# Patient Record
Sex: FEMALE | Race: WHITE | NOT HISPANIC OR LATINO | Employment: UNEMPLOYED | URBAN - METROPOLITAN AREA
[De-identification: names, ages, dates, MRNs, and addresses within clinical notes are randomized per-mention and may not be internally consistent; named-entity substitution may affect disease eponyms.]

---

## 2018-05-25 ENCOUNTER — HOSPITAL ENCOUNTER (EMERGENCY)
Facility: HOSPITAL | Age: 68
Discharge: HOME | End: 2018-05-25
Attending: EMERGENCY MEDICINE
Payer: COMMERCIAL

## 2018-05-25 ENCOUNTER — APPOINTMENT (EMERGENCY)
Dept: RADIOLOGY | Facility: HOSPITAL | Age: 68
End: 2018-05-25
Attending: EMERGENCY MEDICINE
Payer: COMMERCIAL

## 2018-05-25 VITALS
BODY MASS INDEX: 25.69 KG/M2 | TEMPERATURE: 98.4 F | DIASTOLIC BLOOD PRESSURE: 78 MMHG | RESPIRATION RATE: 16 BRPM | OXYGEN SATURATION: 96 % | SYSTOLIC BLOOD PRESSURE: 131 MMHG | HEART RATE: 76 BPM | HEIGHT: 63 IN | WEIGHT: 145 LBS

## 2018-05-25 DIAGNOSIS — R19.7 VOMITING AND DIARRHEA: Primary | ICD-10-CM

## 2018-05-25 DIAGNOSIS — R11.10 VOMITING AND DIARRHEA: Primary | ICD-10-CM

## 2018-05-25 LAB
ALBUMIN SERPL-MCNC: 4.5 G/DL (ref 3.4–5)
ALP SERPL-CCNC: 50 IU/L (ref 35–126)
ALT SERPL-CCNC: 22 IU/L (ref 11–54)
ANION GAP SERPL CALC-SCNC: 7 MEQ/L (ref 3–15)
AST SERPL-CCNC: 24 IU/L (ref 15–41)
BASOPHILS # BLD: 0.03 K/UL (ref 0.01–0.1)
BASOPHILS NFR BLD: 0.4 %
BILIRUB SERPL-MCNC: 0.6 MG/DL (ref 0.3–1.2)
BILIRUB UR QL STRIP.AUTO: NEGATIVE MG/DL
BUN SERPL-MCNC: 14 MG/DL (ref 8–20)
CALCIUM SERPL-MCNC: 9 MG/DL (ref 8.9–10.3)
CHLORIDE SERPL-SCNC: 108 MMOL/L (ref 98–109)
CLARITY UR REFRACT.AUTO: CLEAR
CO2 SERPL-SCNC: 25 MMOL/L (ref 22–32)
COLOR UR AUTO: YELLOW
CREAT SERPL-MCNC: 0.7 MG/DL (ref 0.6–1.1)
DIFFERENTIAL METHOD BLD: ABNORMAL
EOSINOPHIL # BLD: 0.16 K/UL (ref 0.04–0.36)
EOSINOPHIL NFR BLD: 2.2 %
ERYTHROCYTE [DISTWIDTH] IN BLOOD BY AUTOMATED COUNT: 13.1 % (ref 11.7–14.4)
GFR SERPL CREATININE-BSD FRML MDRD: >60 ML/MIN/1.73M*2
GLUCOSE SERPL-MCNC: 91 MG/DL (ref 70–99)
GLUCOSE UR STRIP.AUTO-MCNC: NEGATIVE MG/DL
HCT VFR BLDCO AUTO: 40.4 % (ref 35–45)
HGB BLD-MCNC: 13.4 G/DL (ref 11.8–15.7)
HGB UR QL STRIP.AUTO: NEGATIVE
IMM GRANULOCYTES # BLD AUTO: 0.04 K/UL (ref 0–0.08)
IMM GRANULOCYTES NFR BLD AUTO: 0.5 %
KETONES UR STRIP.AUTO-MCNC: NEGATIVE MG/DL
LEUKOCYTE ESTERASE UR QL STRIP.AUTO: NEGATIVE
LIPASE SERPL-CCNC: 24 U/L (ref 20–51)
LYMPHOCYTES # BLD: 0.93 K/UL (ref 1.2–3.5)
LYMPHOCYTES NFR BLD: 12.6 %
MCH RBC QN AUTO: 30 PG (ref 28–33.2)
MCHC RBC AUTO-ENTMCNC: 33.2 G/DL (ref 32.2–35.5)
MCV RBC AUTO: 90.4 FL (ref 83–98)
MONOCYTES # BLD: 0.45 K/UL (ref 0.28–0.8)
MONOCYTES NFR BLD: 6.1 %
NEUTROPHILS # BLD: 5.79 K/UL (ref 1.7–7)
NEUTS SEG NFR BLD: 78.2 %
NITRITE UR QL STRIP.AUTO: NEGATIVE
NRBC BLD-RTO: 0 %
PDW BLD AUTO: 9.9 FL (ref 9.4–12.3)
PH UR STRIP.AUTO: 6 [PH]
PLATELET # BLD AUTO: 232 K/UL (ref 150–369)
POTASSIUM SERPL-SCNC: 3.5 MMOL/L (ref 3.6–5.1)
PROT SERPL-MCNC: 7.6 G/DL (ref 6–8.2)
PROT UR QL STRIP.AUTO: NEGATIVE
RBC # BLD AUTO: 4.47 M/UL (ref 3.93–5.22)
SODIUM SERPL-SCNC: 140 MMOL/L (ref 136–144)
SP GR UR REFRACT.AUTO: 1.02
UROBILINOGEN UR STRIP-ACNC: 0.2 EU/DL
WBC # BLD AUTO: 7.4 K/UL (ref 3.8–10.5)

## 2018-05-25 PROCEDURE — 99284 EMERGENCY DEPT VISIT MOD MDM: CPT | Mod: 25

## 2018-05-25 PROCEDURE — 80053 COMPREHEN METABOLIC PANEL: CPT | Performed by: EMERGENCY MEDICINE

## 2018-05-25 PROCEDURE — 63600000 HC DRUGS/DETAIL CODE: Performed by: NURSE PRACTITIONER

## 2018-05-25 PROCEDURE — 3E0337Z INTRODUCTION OF ELECTROLYTIC AND WATER BALANCE SUBSTANCE INTO PERIPHERAL VEIN, PERCUTANEOUS APPROACH: ICD-10-PCS | Performed by: EMERGENCY MEDICINE

## 2018-05-25 PROCEDURE — 3E033GC INTRODUCTION OF OTHER THERAPEUTIC SUBSTANCE INTO PERIPHERAL VEIN, PERCUTANEOUS APPROACH: ICD-10-PCS | Performed by: EMERGENCY MEDICINE

## 2018-05-25 PROCEDURE — 85025 COMPLETE CBC W/AUTO DIFF WBC: CPT | Performed by: EMERGENCY MEDICINE

## 2018-05-25 PROCEDURE — 80053 COMPREHEN METABOLIC PANEL: CPT

## 2018-05-25 PROCEDURE — 83690 ASSAY OF LIPASE: CPT | Performed by: EMERGENCY MEDICINE

## 2018-05-25 PROCEDURE — 81003 URINALYSIS AUTO W/O SCOPE: CPT | Performed by: NURSE PRACTITIONER

## 2018-05-25 PROCEDURE — 96375 TX/PRO/DX INJ NEW DRUG ADDON: CPT

## 2018-05-25 PROCEDURE — 74177 CT ABD & PELVIS W/CONTRAST: CPT

## 2018-05-25 PROCEDURE — 83690 ASSAY OF LIPASE: CPT

## 2018-05-25 PROCEDURE — 93005 ELECTROCARDIOGRAM TRACING: CPT | Performed by: EMERGENCY MEDICINE

## 2018-05-25 PROCEDURE — 96374 THER/PROPH/DIAG INJ IV PUSH: CPT

## 2018-05-25 PROCEDURE — 63600105 HC IODINE BASED CONTRAST: Performed by: NURSE PRACTITIONER

## 2018-05-25 PROCEDURE — 3E033NZ INTRODUCTION OF ANALGESICS, HYPNOTICS, SEDATIVES INTO PERIPHERAL VEIN, PERCUTANEOUS APPROACH: ICD-10-PCS | Performed by: EMERGENCY MEDICINE

## 2018-05-25 PROCEDURE — 93005 ELECTROCARDIOGRAM TRACING: CPT

## 2018-05-25 PROCEDURE — 85025 COMPLETE CBC W/AUTO DIFF WBC: CPT

## 2018-05-25 PROCEDURE — 36415 COLL VENOUS BLD VENIPUNCTURE: CPT

## 2018-05-25 PROCEDURE — 25800000 HC PHARMACY IV SOLUTIONS: Performed by: NURSE PRACTITIONER

## 2018-05-25 PROCEDURE — 96361 HYDRATE IV INFUSION ADD-ON: CPT

## 2018-05-25 RX ORDER — MORPHINE SULFATE 4 MG/ML
4 INJECTION, SOLUTION INTRAMUSCULAR; INTRAVENOUS ONCE
Status: COMPLETED | OUTPATIENT
Start: 2018-05-25 | End: 2018-05-25

## 2018-05-25 RX ORDER — OMEPRAZOLE 20 MG/1
20 CAPSULE, DELAYED RELEASE ORAL
COMMUNITY

## 2018-05-25 RX ORDER — CETIRIZINE HYDROCHLORIDE 10 MG/1
10 TABLET ORAL DAILY
COMMUNITY

## 2018-05-25 RX ORDER — SIMVASTATIN 40 MG/1
40 TABLET, FILM COATED ORAL NIGHTLY
COMMUNITY

## 2018-05-25 RX ORDER — ONDANSETRON 4 MG/1
4 TABLET, ORALLY DISINTEGRATING ORAL EVERY 8 HOURS PRN
Qty: 10 TABLET | Refills: 0 | Status: SHIPPED | OUTPATIENT
Start: 2018-05-25

## 2018-05-25 RX ORDER — ONDANSETRON HYDROCHLORIDE 2 MG/ML
4 INJECTION, SOLUTION INTRAVENOUS ONCE
Status: COMPLETED | OUTPATIENT
Start: 2018-05-25 | End: 2018-05-25

## 2018-05-25 RX ORDER — TRAZODONE HYDROCHLORIDE 100 MG/1
450 TABLET ORAL NIGHTLY
COMMUNITY

## 2018-05-25 RX ORDER — SERTRALINE HYDROCHLORIDE 100 MG/1
200 TABLET, FILM COATED ORAL DAILY
COMMUNITY

## 2018-05-25 RX ADMIN — IOHEXOL 120 ML: 300 INJECTION, SOLUTION INTRAVENOUS at 21:51

## 2018-05-25 RX ADMIN — SODIUM CHLORIDE 1000 ML: 9 INJECTION, SOLUTION INTRAVENOUS at 21:10

## 2018-05-25 RX ADMIN — MORPHINE SULFATE 4 MG: 4 INJECTION INTRAVENOUS at 21:10

## 2018-05-25 RX ADMIN — ONDANSETRON 4 MG: 2 INJECTION INTRAMUSCULAR; INTRAVENOUS at 21:10

## 2018-05-25 ASSESSMENT — ENCOUNTER SYMPTOMS
BACK PAIN: 0
VOMITING: 1
DIARRHEA: 1
NUMBNESS: 0
DIAPHORESIS: 0
FEVER: 0
CHILLS: 0
FREQUENCY: 0
DIFFICULTY URINATING: 0
SHORTNESS OF BREATH: 0
ABDOMINAL PAIN: 1
BLOOD IN STOOL: 1
FLANK PAIN: 0
DYSURIA: 0
HEMATURIA: 0
NAUSEA: 1

## 2018-05-26 LAB
ATRIAL RATE: 81
P AXIS: 58
PR INTERVAL: 130
QRS DURATION: 84
QT INTERVAL: 384
QTC CALCULATION(BAZETT): 446
R AXIS: 25
RAINBOW HOLD SPECIMEN: NORMAL
T WAVE AXIS: 86
VENTRICULAR RATE: 81

## 2018-05-26 NOTE — ED PROVIDER NOTES
HPI     Chief Complaint   Patient presents with   • Nausea   • Vomiting   • Diarrhea     68 y/o female with pmh of GERD on Omeprozole presents to the ED c/o n/v/d x last night. Pt reports she had explosive watery diarrhea x last night. Today, pt states her diarrhea has persisted and has noticed BRB in her stool. Pt notes she also developed nausea and vomiting x today. Admits abdominal pain, n/v/d, blood in stool. Denies SOB, fever, chills, urinary sx, chest pain. Pt states she is from out of town and ate crab salad last night prior to onset of sx. Pt reports she receives injections in her left scapula and states she has had continuous pain x 2 week. Pt states last injection was 1.5 months ago.       History provided by:  Patient   used: No    Diarrhea   Quality:  Explosive and watery  Severity:  Moderate  Onset quality:  Sudden  Number of episodes:  Mutliple episodes  Timing:  Constant  Progression:  Worsening  Relieved by:  Nothing  Worsened by:  Nothing  Ineffective treatments:  None tried  Associated symptoms: abdominal pain and vomiting    Associated symptoms: no chills, no diaphoresis, no fever and no URI    Risk factors: suspect food intake (possible - ate crab salad last night)    Risk factors: no recent antibiotic use, no sick contacts and no travel to endemic areas         Patient History     Past Medical History:   Diagnosis Date   • Brain aneurysm    • Depression    • High cholesterol        Past Surgical History:   Procedure Laterality Date   • CEREBRAL ANEURYSM REPAIR     • SHOULDER SURGERY         History reviewed. No pertinent family history.    Social History   Substance Use Topics   • Smoking status: Never Smoker   • Smokeless tobacco: Never Used   • Alcohol use No       Systems Reviewed from Nursing Triage:  Tobacco  Allergies  Meds  Problems  Med Hx  Surg Hx  Soc Hx         Review of Systems     Review of Systems   Constitutional: Negative for chills, diaphoresis and  "fever.   Respiratory: Negative for shortness of breath.    Cardiovascular: Negative for chest pain and leg swelling.   Gastrointestinal: Positive for abdominal pain, blood in stool, diarrhea, nausea and vomiting.   Genitourinary: Negative for difficulty urinating, dysuria, flank pain, frequency and hematuria.   Musculoskeletal: Negative for back pain.   Skin: Negative for rash.   Neurological: Negative for numbness.   All other systems reviewed and are negative.       Physical Exam     ED Triage Vitals   Temp Heart Rate Resp BP SpO2   05/25/18 1927 05/25/18 1927 05/25/18 1927 05/25/18 1927 05/25/18 1927   37.7 °C (99.8 °F) 96 20 137/78 95 %      Temp Source Heart Rate Source Patient Position BP Location FiO2 (%) (Set)   05/25/18 1927 05/25/18 2032 -- -- --   Temporal Monitor          Pulse Ox %: 95 % (05/25/18 2057)  Pulse Ox Interpretation: Normal (05/25/18 2057)  Heart Rate: 84 (05/25/18 2057)  Rhythm Strip Interpretation: Normal Sinus Rhythm (05/25/18 2057)    Patient Vitals for the past 24 hrs:   BP Temp Temp src Pulse Resp SpO2 Height Weight   05/25/18 2115 135/70 - - 76 20 98 % - -   05/25/18 2032 134/63 - - 84 18 95 % - -   05/25/18 1927 137/78 37.7 °C (99.8 °F) Temporal 96 20 95 % 1.6 m (5' 3\") 65.8 kg (145 lb)           Physical Exam   Constitutional: She is oriented to person, place, and time. She appears well-developed and well-nourished. No distress.   HENT:   Head: Normocephalic and atraumatic.   Neck: Normal range of motion.   Cardiovascular: Normal rate, regular rhythm, normal heart sounds and intact distal pulses.    Pulmonary/Chest: Effort normal and breath sounds normal. No respiratory distress. She exhibits no tenderness.   Abdominal: Soft. There is generalized tenderness.   Musculoskeletal: Normal range of motion.   Neurological: She is alert and oriented to person, place, and time.   Skin: Skin is warm and dry. No rash noted.   Psychiatric: She has a normal mood and affect.   Nursing note and " vitals reviewed.           Procedures    ED Course & MDM     Labs Reviewed   COMPREHENSIVE METABOLIC PANEL - Abnormal        Result Value    Potassium 3.5 (*)     Sodium 140      Chloride 108      CO2 25      BUN 14      Creatinine 0.7      Glucose 91      Calcium 9.0      AST (SGOT) 24      ALT (SGPT) 22      Alkaline Phosphatase 50      Total Protein 7.6      Albumin 4.5      Bilirubin, Total 0.6      eGFR >60.0      Anion Gap 7     DIFF COUNT - Abnormal     Lymphocytes, Absolute 0.93 (*)     Differential Type Auto      nRBC 0.0      Immature Granulocytes 0.5      Neutrophils 78.2      Lymphocytes 12.6      Monocytes 6.1      Eosinophils 2.2      Basophils 0.4      Immature Granulocytes, Absolute 0.04      Neutrophils, Absolute 5.79      Monocytes, Absolute 0.45      Eosinophils, Absolute 0.16      Basophils, Absolute 0.03     LIPASE - Normal    Lipase 24     CBC - Normal    WBC 7.40      RBC 4.47      Hemoglobin 13.4      Hematocrit 40.4      MCV 90.4      MCH 30.0      MCHC 33.2      RDW 13.1      Platelets 232      MPV 9.9     UA REFLEX CULTURE (MACROSCOPIC) - Normal    Color, Urine Yellow      Clarity, Urine Clear      Specific Gravity, Urine 1.020      pH, Urine 6.0      Leukocyte Esterase Negative      Nitrite, Urine Negative      Protein, Urine Negative      Glucose, Urine Negative      Ketones, Urine Negative      Urobilinogen, Urine 0.2      Bilirubin, Urine Negative      Blood, Urine Negative     CBC AND DIFFERENTIAL    Narrative:     The following orders were created for panel order CBC and differential.  Procedure                               Abnormality         Status                     ---------                               -----------         ------                     CBC[[id number]]                           Normal              Final result               Diff Count[[id number]]                    Abnormal            Final result                 Please view results for these tests on the individual  orders.   URINALYSIS REFLEX CULTURE    Narrative:     The following orders were created for panel order Urinalysis w/ reflex culture.  Procedure                               Abnormality         Status                     ---------                               -----------         ------                     UA Reflex to Culture (Mac...[[id number]]  Normal              Final result                 Please view results for these tests on the individual orders.   RAINBOW DRAW PANEL    Narrative:     The following orders were created for panel order [place] Draw Panel.  Procedure                               Abnormality         Status                     ---------                               -----------         ------                     RAINBOW RED[[id number]]                                       In process                 RAINBOW LT BLUE[[id number]]                                   In process                 RAINBOW LT GREEN[[id number]]                                  In process                 RAINBOW GOLD[[id number]]                                      In process                   Please view results for these tests on the individual orders.   RAINBOW RED   RAINBOW LT BLUE   RAINBOW LT GREEN   RAINBOW GOLD       ECG 12 lead    (Results Pending)   CT ABDOMEN PELVIS WITH IV CONTRAST    (Results Pending)           MDM  Number of Diagnoses or Management Options  Vomiting and diarrhea:      Amount and/or Complexity of Data Reviewed  Clinical lab tests: reviewed  Tests in the radiology section of CPT®: reviewed  Discuss the patient with other providers: yes    Patient Progress  Patient progress: stable           ED Course as of May 25 2248   Fri May 25, 2018   2136 Impression: N/V/D, generalized abdominal pain, suspect bad food exposure  Plan: Labs, lipase, UA/UC, CT A/P, IV fluids, zofran  [EP]   2239 UA does not indicate infection, no leukocytosis, WBC, lytes, renal function, lipase wnl  D/w lab and radiology results,  instructed to follow clear liquid diet and return precautions CT ABDOMEN PELVIS WITH IV CONTRAST [EP]      ED Course User Index  [EP] Elizabeth P Pallante, CRNP         Clinical Impressions as of May 25 2248   Vomiting and diarrhea     Disposition:     By signing my name below, I, Sivan Vela, attest that this documentation has been prepared under the direction and in the presence of E. Pallante, CRNP.    5/25/2018 9:37 PM      I, Elizabeth P. Pallante, personally performed the services described in this documentation, as documented by the scribe in my presence, and it is both accurate and complete.  5/25/2018 10:51 PM         Sivan Vela  05/25/18 2057       Elizabeth P Pallante, CRNP  05/25/18 7183

## 2018-05-26 NOTE — ED ATTESTATION NOTE
The patient was evaluated and managed by the physician assistant / nurse practitioner. I agree.      Justin Peterson MD  05/25/18 7351

## 2018-05-26 NOTE — DISCHARGE INSTRUCTIONS
Follow a clear liquid diet until symptoms resolve.   Drink plenty of fluids, including fluids with electrolytes (Gatorade, Pedialyte, etc).    Take Zofran as needed for nausea and vomiting.    If you develop worsening pain, unable to tolerate liquids, fevers (>100.4) or any other new or concerning symptoms return to the Emergency Department.

## 2018-06-08 ENCOUNTER — HOSPITAL ENCOUNTER (OUTPATIENT)
Dept: PREADMISSION TESTING | Age: 68
Discharge: HOME | End: 2018-06-08
Attending: ORTHOPAEDIC SURGERY
Payer: COMMERCIAL

## 2018-06-08 LAB
ALBUMIN SERPL-MCNC: 4.7 G/DL (ref 3.4–5)
ALBUMIN/GLOB SERPL: 1.4 {RATIO} (ref 0.8–1.7)
ALP SERPL-CCNC: 58 U/L (ref 45–117)
ALT SERPL-CCNC: 20 U/L (ref 13–56)
ANION GAP SERPL CALC-SCNC: 7 MMOL/L (ref 3–18)
AST SERPL-CCNC: 12 U/L (ref 15–37)
ATRIAL RATE: 72 BPM
BASOPHILS # BLD: 0.1 K/UL (ref 0–0.06)
BASOPHILS NFR BLD: 1 % (ref 0–2)
BILIRUB SERPL-MCNC: 0.6 MG/DL (ref 0.2–1)
BUN SERPL-MCNC: 22 MG/DL (ref 7–18)
BUN/CREAT SERPL: 22 (ref 12–20)
CALCIUM SERPL-MCNC: 8.8 MG/DL (ref 8.5–10.1)
CALCULATED P AXIS, ECG09: 17 DEGREES
CALCULATED R AXIS, ECG10: 27 DEGREES
CALCULATED T AXIS, ECG11: 52 DEGREES
CHLORIDE SERPL-SCNC: 102 MMOL/L (ref 100–108)
CO2 SERPL-SCNC: 32 MMOL/L (ref 21–32)
CREAT SERPL-MCNC: 0.98 MG/DL (ref 0.6–1.3)
DIAGNOSIS, 93000: NORMAL
DIFFERENTIAL METHOD BLD: ABNORMAL
EOSINOPHIL # BLD: 0.2 K/UL (ref 0–0.4)
EOSINOPHIL NFR BLD: 2 % (ref 0–5)
ERYTHROCYTE [DISTWIDTH] IN BLOOD BY AUTOMATED COUNT: 13.1 % (ref 11.6–14.5)
GLOBULIN SER CALC-MCNC: 3.3 G/DL (ref 2–4)
GLUCOSE SERPL-MCNC: 95 MG/DL (ref 74–99)
HCT VFR BLD AUTO: 43.2 % (ref 35–45)
HGB BLD-MCNC: 13.9 G/DL (ref 12–16)
LYMPHOCYTES # BLD: 1.9 K/UL (ref 0.9–3.6)
LYMPHOCYTES NFR BLD: 25 % (ref 21–52)
MCH RBC QN AUTO: 29.6 PG (ref 24–34)
MCHC RBC AUTO-ENTMCNC: 32.2 G/DL (ref 31–37)
MCV RBC AUTO: 91.9 FL (ref 74–97)
MONOCYTES # BLD: 0.5 K/UL (ref 0.05–1.2)
MONOCYTES NFR BLD: 6 % (ref 3–10)
NEUTS SEG # BLD: 5 K/UL (ref 1.8–8)
NEUTS SEG NFR BLD: 66 % (ref 40–73)
P-R INTERVAL, ECG05: 124 MS
PLATELET # BLD AUTO: 329 K/UL (ref 135–420)
PMV BLD AUTO: 9.8 FL (ref 9.2–11.8)
POTASSIUM SERPL-SCNC: 4.7 MMOL/L (ref 3.5–5.5)
PROT SERPL-MCNC: 8 G/DL (ref 6.4–8.2)
Q-T INTERVAL, ECG07: 406 MS
QRS DURATION, ECG06: 86 MS
QTC CALCULATION (BEZET), ECG08: 444 MS
RBC # BLD AUTO: 4.7 M/UL (ref 4.2–5.3)
SODIUM SERPL-SCNC: 141 MMOL/L (ref 136–145)
VENTRICULAR RATE, ECG03: 72 BPM
WBC # BLD AUTO: 7.7 K/UL (ref 4.6–13.2)

## 2018-06-08 PROCEDURE — 80053 COMPREHEN METABOLIC PANEL: CPT | Performed by: ORTHOPAEDIC SURGERY

## 2018-06-08 PROCEDURE — 85025 COMPLETE CBC W/AUTO DIFF WBC: CPT | Performed by: ORTHOPAEDIC SURGERY

## 2018-06-08 PROCEDURE — 93005 ELECTROCARDIOGRAM TRACING: CPT

## 2018-06-08 PROCEDURE — 36415 COLL VENOUS BLD VENIPUNCTURE: CPT | Performed by: ORTHOPAEDIC SURGERY

## 2018-06-12 NOTE — H&P
[facility]  History and Physical Exam    Patient: Maury Lopez MRN: [id number]  SSN: [id number]    YOB: 1950  Age: 74 y.o. Sex: female      Subjective:      Chief Complaint: Left shoulder pain    History of Present Illness:  Patient complains of left shoulder pain. She saw Dr. Nain Mendoza a couple months ago and had a cortisone injection. That injection only helped temporarily. She developed increasing pain. She is only comfortable if she holds her arm above her head. She had a previous distal clavicle resection. Past Medical History:   Diagnosis Date    Aneurysm ([address] 1996    cerebral    Arthritis     GERD (gastroesophageal reflux disease)     Psychiatric disorder     chronic depression and OCD    PVC (premature ventricular contraction)      Past Surgical History:   Procedure Laterality Date    HX CATARACT REMOVAL Bilateral     HX PELVIC LAPAROSCOPY      infertility x 3    HX TONSILLECTOMY  1968    NEUROLOGICAL PROCEDURE UNLISTED  1996    Titanium clip for cerebral aneurysm     Social History     Occupational History    Not on file. Social History Main Topics    Smoking status: Former Smoker    Smokeless tobacco: Never Used    Alcohol use No      Comment: H/O alcoholism, last drink 3 months ago    Drug use: No      Comment: H/O in the past marijauna and cocaine    Sexual activity: Not Currently     Prior to Admission medications    Medication Sig Start Date End Date Taking? Authorizing Provider   traZODone (DESYREL) 150 mg tablet Take 450 mg by mouth nightly. Historical Provider   simvastatin (ZOCOR) 20 mg tablet Take 20 mg by mouth nightly. Historical Provider   lansoprazole (PREVACID) 30 mg capsule Take 30 mg by mouth Daily (before breakfast). Historical Provider   gabapentin (NEURONTIN) 300 mg capsule Take 900 mg by mouth four (4) times daily.     Historical Provider   busPIRone (BUSPAR) 10 mg tablet Take 10 mg by mouth two (2) times a day. Historical Provider   sertraline (ZOLOFT) 100 mg tablet Take 200 mg by mouth daily. Historical Provider   cholecalciferol (VITAMIN D3) 1,000 unit tablet Take 2,000 Units by mouth daily. Historical Provider     Family history non-contributory. Allergies: Allergies   Allergen Reactions    Tetanus And Diphther. Tox (Pf) Swelling     Swelling of arm, and fever    Pcn [Penicillins] Rash        Review of Systems:  A comprehensive review of systems was negative. Objective:       Physical Exam:  HEENT: Normocephalic, atraumatic  Lungs:  Clear to auscultation  Heart:   Regular rate and rhythm  Abdomen: Soft  Extremities:  Pain with range of motion of the left shoulder. Strength limited with internal/external rotation and supraspinatus lift    Pain with Knight. No pain with Neer. Assessment:       Left shoulder pain. Plan:       Proceed with scheduled Left shoulder arthroscopy. The various methods of treatment have been discussed with the patient and family. After consideration of risks, benefits, and other options for treatment, the patient has consented to surgical interventions. Questions were answered and preoperative teaching was done by Dr. Fredy Gabriel.      Signed By: Peña Tavarez PA-C     June 12, 2018

## 2018-06-13 ENCOUNTER — HOSPITAL ENCOUNTER (OUTPATIENT)
Age: 68
Setting detail: OUTPATIENT SURGERY
Discharge: HOME | End: 2018-06-13
Attending: ORTHOPAEDIC SURGERY | Admitting: ORTHOPAEDIC SURGERY
Payer: COMMERCIAL

## 2018-06-13 ENCOUNTER — ANESTHESIA (OUTPATIENT)
Dept: SURGERY | Age: 68
End: 2018-06-13
Payer: COMMERCIAL

## 2018-06-13 ENCOUNTER — ANESTHESIA EVENT (OUTPATIENT)
Dept: SURGERY | Age: 68
End: 2018-06-13
Payer: COMMERCIAL

## 2018-06-13 VITALS
HEIGHT: 63 IN | BODY MASS INDEX: 26.29 KG/M2 | RESPIRATION RATE: 18 BRPM | OXYGEN SATURATION: 98 % | SYSTOLIC BLOOD PRESSURE: 105 MMHG | WEIGHT: 148.38 LBS | HEART RATE: 90 BPM | TEMPERATURE: 97.2 F | DIASTOLIC BLOOD PRESSURE: 59 MMHG

## 2018-06-13 DIAGNOSIS — M75.42 SHOULDER IMPINGEMENT SYNDROME, LEFT: Primary | ICD-10-CM

## 2018-06-13 PROCEDURE — 74011250636 HC RX REV CODE- 250/636: Performed by: ORTHOPAEDIC SURGERY

## 2018-06-13 PROCEDURE — 76942 ECHO GUIDE FOR BIOPSY: CPT | Performed by: ORTHOPAEDIC SURGERY

## 2018-06-13 PROCEDURE — 77030034478 HC TU IRR ARTHRO PT ARTH -B: Performed by: ORTHOPAEDIC SURGERY

## 2018-06-13 PROCEDURE — 74011250636 HC RX REV CODE- 250/636

## 2018-06-13 PROCEDURE — 77030018725 HC ELECTRD 90DEG DISP J&J -D: Performed by: ORTHOPAEDIC SURGERY

## 2018-06-13 PROCEDURE — 76010000149 HC OR TIME 1 TO 1.5 HR: Performed by: ORTHOPAEDIC SURGERY

## 2018-06-13 PROCEDURE — 74011000250 HC RX REV CODE- 250

## 2018-06-13 PROCEDURE — 77030020782 HC GWN BAIR PAWS FLX 3M -B: Performed by: ORTHOPAEDIC SURGERY

## 2018-06-13 PROCEDURE — 76210000021 HC REC RM PH II 0.5 TO 1 HR: Performed by: ORTHOPAEDIC SURGERY

## 2018-06-13 PROCEDURE — 64415 NJX AA&/STRD BRCH PLXS IMG: CPT | Performed by: ORTHOPAEDIC SURGERY

## 2018-06-13 PROCEDURE — 74011250636 HC RX REV CODE- 250/636: Performed by: ANESTHESIOLOGY

## 2018-06-13 PROCEDURE — 76210000006 HC OR PH I REC 0.5 TO 1 HR: Performed by: ORTHOPAEDIC SURGERY

## 2018-06-13 PROCEDURE — 77030036565 HC WRP CLDTHER ANK S2SG -A: Performed by: ORTHOPAEDIC SURGERY

## 2018-06-13 PROCEDURE — 77030006884 HC BLD SHV INCIS S&N -B: Performed by: ORTHOPAEDIC SURGERY

## 2018-06-13 PROCEDURE — 76060000033 HC ANESTHESIA 1 TO 1.5 HR: Performed by: ORTHOPAEDIC SURGERY

## 2018-06-13 PROCEDURE — 77030018835 HC SOL IRR LR ICUM -A: Performed by: ORTHOPAEDIC SURGERY

## 2018-06-13 PROCEDURE — 77030032490 HC SLV COMPR SCD KNE COVD -B: Performed by: ORTHOPAEDIC SURGERY

## 2018-06-13 PROCEDURE — 74011000250 HC RX REV CODE- 250: Performed by: ANESTHESIOLOGY

## 2018-06-13 PROCEDURE — 74011000272 HC RX REV CODE- 272: Performed by: ORTHOPAEDIC SURGERY

## 2018-06-13 PROCEDURE — 77030002916 HC SUT ETHLN J&J -A: Performed by: ORTHOPAEDIC SURGERY

## 2018-06-13 PROCEDURE — 77030006878 HC BLD SHV FLL RAD S&N -C: Performed by: ORTHOPAEDIC SURGERY

## 2018-06-13 RX ORDER — CEFAZOLIN SODIUM/WATER 2 G/20 ML
2 SYRINGE (ML) INTRAVENOUS ONCE
Status: DISCONTINUED | OUTPATIENT
Start: 2018-06-13 | End: 2018-06-13 | Stop reason: HOSPADM

## 2018-06-13 RX ORDER — FLUMAZENIL 0.1 MG/ML
0.2 INJECTION INTRAVENOUS
Status: DISCONTINUED | OUTPATIENT
Start: 2018-06-13 | End: 2018-06-13 | Stop reason: HOSPADM

## 2018-06-13 RX ORDER — DEXAMETHASONE SODIUM PHOSPHATE 4 MG/ML
INJECTION, SOLUTION INTRA-ARTICULAR; INTRALESIONAL; INTRAMUSCULAR; INTRAVENOUS; SOFT TISSUE AS NEEDED
Status: DISCONTINUED | OUTPATIENT
Start: 2018-06-13 | End: 2018-06-13 | Stop reason: HOSPADM

## 2018-06-13 RX ORDER — MIDAZOLAM HYDROCHLORIDE 1 MG/ML
INJECTION, SOLUTION INTRAMUSCULAR; INTRAVENOUS AS NEEDED
Status: DISCONTINUED | OUTPATIENT
Start: 2018-06-13 | End: 2018-06-13 | Stop reason: HOSPADM

## 2018-06-13 RX ORDER — INSULIN LISPRO 100 [IU]/ML
INJECTION, SOLUTION INTRAVENOUS; SUBCUTANEOUS ONCE
Status: DISCONTINUED | OUTPATIENT
Start: 2018-06-13 | End: 2018-06-13 | Stop reason: HOSPADM

## 2018-06-13 RX ORDER — ONDANSETRON 2 MG/ML
INJECTION INTRAMUSCULAR; INTRAVENOUS AS NEEDED
Status: DISCONTINUED | OUTPATIENT
Start: 2018-06-13 | End: 2018-06-13 | Stop reason: HOSPADM

## 2018-06-13 RX ORDER — PROPOFOL 10 MG/ML
INJECTION, EMULSION INTRAVENOUS AS NEEDED
Status: DISCONTINUED | OUTPATIENT
Start: 2018-06-13 | End: 2018-06-13 | Stop reason: HOSPADM

## 2018-06-13 RX ORDER — SODIUM CHLORIDE 0.9 % (FLUSH) 0.9 %
5-10 SYRINGE (ML) INJECTION AS NEEDED
Status: DISCONTINUED | OUTPATIENT
Start: 2018-06-13 | End: 2018-06-13 | Stop reason: HOSPADM

## 2018-06-13 RX ORDER — NALOXONE HYDROCHLORIDE 0.4 MG/ML
0.1 INJECTION, SOLUTION INTRAMUSCULAR; INTRAVENOUS; SUBCUTANEOUS AS NEEDED
Status: DISCONTINUED | OUTPATIENT
Start: 2018-06-13 | End: 2018-06-13 | Stop reason: HOSPADM

## 2018-06-13 RX ORDER — NEOSTIGMINE METHYLSULFATE 5 MG/5 ML
SYRINGE (ML) INTRAVENOUS AS NEEDED
Status: DISCONTINUED | OUTPATIENT
Start: 2018-06-13 | End: 2018-06-13 | Stop reason: HOSPADM

## 2018-06-13 RX ORDER — NALOXONE HYDROCHLORIDE 4 MG/.1ML
SPRAY NASAL
Qty: 3 EACH | Refills: 0 | Status: SHIPPED | OUTPATIENT
Start: 2018-06-13

## 2018-06-13 RX ORDER — MAGNESIUM SULFATE 100 %
4 CRYSTALS MISCELLANEOUS AS NEEDED
Status: DISCONTINUED | OUTPATIENT
Start: 2018-06-13 | End: 2018-06-13 | Stop reason: HOSPADM

## 2018-06-13 RX ORDER — LIDOCAINE HYDROCHLORIDE 20 MG/ML
INJECTION, SOLUTION EPIDURAL; INFILTRATION; INTRACAUDAL; PERINEURAL AS NEEDED
Status: DISCONTINUED | OUTPATIENT
Start: 2018-06-13 | End: 2018-06-13 | Stop reason: HOSPADM

## 2018-06-13 RX ORDER — OXYCODONE AND ACETAMINOPHEN 5; 325 MG/1; MG/1
1-2 TABLET ORAL
Qty: 30 TAB | Refills: 0 | Status: SHIPPED | OUTPATIENT
Start: 2018-06-13

## 2018-06-13 RX ORDER — DEXTROSE 50 % IN WATER (D50W) INTRAVENOUS SYRINGE
25-50 AS NEEDED
Status: DISCONTINUED | OUTPATIENT
Start: 2018-06-13 | End: 2018-06-13 | Stop reason: HOSPADM

## 2018-06-13 RX ORDER — GLYCOPYRROLATE 0.2 MG/ML
INJECTION INTRAMUSCULAR; INTRAVENOUS AS NEEDED
Status: DISCONTINUED | OUTPATIENT
Start: 2018-06-13 | End: 2018-06-13 | Stop reason: HOSPADM

## 2018-06-13 RX ORDER — CYCLOBENZAPRINE HCL 10 MG
5 TABLET ORAL
Qty: 30 TAB | Refills: 0 | Status: SHIPPED | OUTPATIENT
Start: 2018-06-13

## 2018-06-13 RX ORDER — FENTANYL CITRATE 50 UG/ML
50 INJECTION, SOLUTION INTRAMUSCULAR; INTRAVENOUS
Status: DISCONTINUED | OUTPATIENT
Start: 2018-06-13 | End: 2018-06-13 | Stop reason: HOSPADM

## 2018-06-13 RX ORDER — FENTANYL CITRATE 50 UG/ML
INJECTION, SOLUTION INTRAMUSCULAR; INTRAVENOUS AS NEEDED
Status: DISCONTINUED | OUTPATIENT
Start: 2018-06-13 | End: 2018-06-13 | Stop reason: HOSPADM

## 2018-06-13 RX ORDER — ROCURONIUM BROMIDE 10 MG/ML
INJECTION, SOLUTION INTRAVENOUS AS NEEDED
Status: DISCONTINUED | OUTPATIENT
Start: 2018-06-13 | End: 2018-06-13 | Stop reason: HOSPADM

## 2018-06-13 RX ORDER — SODIUM CHLORIDE, SODIUM LACTATE, POTASSIUM CHLORIDE, CALCIUM CHLORIDE 600; 310; 30; 20 MG/100ML; MG/100ML; MG/100ML; MG/100ML
1000 INJECTION, SOLUTION INTRAVENOUS CONTINUOUS
Status: DISCONTINUED | OUTPATIENT
Start: 2018-06-13 | End: 2018-06-13 | Stop reason: HOSPADM

## 2018-06-13 RX ORDER — SODIUM CHLORIDE, SODIUM LACTATE, POTASSIUM CHLORIDE, CALCIUM CHLORIDE 600; 310; 30; 20 MG/100ML; MG/100ML; MG/100ML; MG/100ML
125 INJECTION, SOLUTION INTRAVENOUS CONTINUOUS
Status: DISCONTINUED | OUTPATIENT
Start: 2018-06-13 | End: 2018-06-13 | Stop reason: HOSPADM

## 2018-06-13 RX ORDER — ROPIVACAINE HYDROCHLORIDE 5 MG/ML
INJECTION, SOLUTION EPIDURAL; INFILTRATION; PERINEURAL AS NEEDED
Status: DISCONTINUED | OUTPATIENT
Start: 2018-06-13 | End: 2018-06-13 | Stop reason: HOSPADM

## 2018-06-13 RX ORDER — EPHEDRINE SULFATE/0.9% NACL/PF 25 MG/5 ML
SYRINGE (ML) INTRAVENOUS AS NEEDED
Status: DISCONTINUED | OUTPATIENT
Start: 2018-06-13 | End: 2018-06-13 | Stop reason: HOSPADM

## 2018-06-13 RX ADMIN — FAMOTIDINE 20 MG: 10 INJECTION, SOLUTION INTRAVENOUS at 12:22

## 2018-06-13 RX ADMIN — Medication 3 MG: at 13:27

## 2018-06-13 RX ADMIN — MIDAZOLAM HYDROCHLORIDE 2 MG: 1 INJECTION, SOLUTION INTRAMUSCULAR; INTRAVENOUS at 12:02

## 2018-06-13 RX ADMIN — GLYCOPYRROLATE 0.4 MG: 0.2 INJECTION INTRAMUSCULAR; INTRAVENOUS at 13:27

## 2018-06-13 RX ADMIN — ROPIVACAINE HYDROCHLORIDE 25 ML: 5 INJECTION, SOLUTION EPIDURAL; INFILTRATION; PERINEURAL at 12:05

## 2018-06-13 RX ADMIN — Medication 5 MG: at 13:09

## 2018-06-13 RX ADMIN — GLYCOPYRROLATE 0.2 MG: 0.2 INJECTION INTRAMUSCULAR; INTRAVENOUS at 12:52

## 2018-06-13 RX ADMIN — FENTANYL CITRATE 100 MCG: 50 INJECTION, SOLUTION INTRAMUSCULAR; INTRAVENOUS at 12:02

## 2018-06-13 RX ADMIN — SODIUM CHLORIDE, SODIUM LACTATE, POTASSIUM CHLORIDE, AND CALCIUM CHLORIDE: 600; 310; 30; 20 INJECTION, SOLUTION INTRAVENOUS at 12:23

## 2018-06-13 RX ADMIN — ONDANSETRON 4 MG: 2 INJECTION INTRAMUSCULAR; INTRAVENOUS at 12:23

## 2018-06-13 RX ADMIN — PROPOFOL 175 MG: 10 INJECTION, EMULSION INTRAVENOUS at 12:38

## 2018-06-13 RX ADMIN — LIDOCAINE HYDROCHLORIDE 80 MG: 20 INJECTION, SOLUTION EPIDURAL; INFILTRATION; INTRACAUDAL; PERINEURAL at 12:38

## 2018-06-13 RX ADMIN — SODIUM CHLORIDE, SODIUM LACTATE, POTASSIUM CHLORIDE, AND CALCIUM CHLORIDE 125 ML/HR: 600; 310; 30; 20 INJECTION, SOLUTION INTRAVENOUS at 11:20

## 2018-06-13 RX ADMIN — SODIUM CHLORIDE, SODIUM LACTATE, POTASSIUM CHLORIDE, AND CALCIUM CHLORIDE 125 ML/HR: 600; 310; 30; 20 INJECTION, SOLUTION INTRAVENOUS at 13:52

## 2018-06-13 RX ADMIN — SODIUM CHLORIDE, SODIUM LACTATE, POTASSIUM CHLORIDE, AND CALCIUM CHLORIDE 1000 ML: 600; 310; 30; 20 INJECTION, SOLUTION INTRAVENOUS at 11:23

## 2018-06-13 RX ADMIN — ROCURONIUM BROMIDE 40 MG: 10 INJECTION, SOLUTION INTRAVENOUS at 12:38

## 2018-06-13 RX ADMIN — Medication 10 MG: at 12:54

## 2018-06-13 RX ADMIN — DEXAMETHASONE SODIUM PHOSPHATE 4 MG: 4 INJECTION, SOLUTION INTRA-ARTICULAR; INTRALESIONAL; INTRAMUSCULAR; INTRAVENOUS; SOFT TISSUE at 12:57

## 2018-06-13 RX ADMIN — Medication 10 MG: at 12:51

## 2018-06-13 RX ADMIN — Medication 5 MG: at 12:49

## 2018-06-13 NOTE — PERIOP NOTES
Report given to [facility], RN. Patient to transfer to Phase II for progression of care.

## 2018-06-13 NOTE — DISCHARGE INSTRUCTIONS
[id number] [facility]   Patient Discharge Instructions     Arthroscopic Rotator Cuff Repair Discharge Instructions  Dr. Martina Zayas    Please take the time to review the following instructions before you leave the hospital and use them as guidelines during your recovery from surgery. If you have any questions, you may contact my office at [phone]. Robert Indira / Tiarra Jacinto may change your dressings as needed. Beginning 2 days after you are discharged from the hospital, you should change your dressings daily. A big, bulky dressing isnt necessary as long as there is no drainage from the incisions. You can put a band-aid or piece of gauze over each incision. It isnt necessary to apply antibiotic ointment to your incisions. Showering / Bathing    You may shower 2 days after your surgery. Your dressing may be removed for showering. You may get your incisions wet in the shower. Do not vigorously scrub your incisions. Apply a clean, dry dressing after you have dried your incisions. Do not take a bath or get into a swimming pool or Jacuzzi until your follow up. Do not soak your incisions under water. Sling     Keep your arm in the immobilizer/sling at all times except when showering and changing your clothes, and doing the exercises shown to you by your physical/occupational therapist prior to your discharge from the hospital. Keep your arm at your side when changing your clothes and showering. Do not move it away from your body. Ice and Elevation    Continue ice consistently for 48 hours after surgery. After 48 hours, you should ice 3 times per day for 20 minutes at a time for the next 5 days. After 1 week from surgery, you may use ice as needed for pain. Diet    You may advance your prehospital diet as tolerated. Increase your clear liquid intake for the next 2-3 days. Medications    1.  You will be given prescriptions for pain medication, inflammation, and nausea when you are discharged from the hospital. Please use the medications as prescribed. Pain medications may cause constipation - Colace or Milk of Magnesia may be used as needed. Other possible side effects of pain medications are dizziness, headache, nausea, vomiting, and urinary retention. Discontinue the pain medication if you develop itching, rash, shortness of breath, or difficulties swallowing. If these symptoms become severe or arent relieved by discontinuing the medication, you should seek immediate medical attention. 2. Refills of pain medication are authorized during office hours only (8AM - 5PM Monday through Friday)  3. If you were prescribed Percocet/Oxycodone or Dilaudid/Hydromorphone you must have a written prescription. These medications cannot be leagally called into the pharmacy. 4. Do not take Tylenol/Acetaminophen in addition to your pain medication, as most pain medications already contain this. Do not exceed 4000mg of Tylenol/Acetaminophen per day. 5. You may resume the medication you were taking prior to your surgery. Pain medication may change the effects of any antidepressant medication you may be taking. If you have any questions about possible interactions between your regular medication and the pain medication, you should consult the physician who prescribes your regular medications. Follow up appointment  Please follow up at your scheduled appointment 7-10 days from the day of your surgery. If you do not already have an appointment, please call our office at [phone] for your follow up appointment. Physical Therapy: If you already have a therapy appointment, please be sure to attend your sessions as scheduled. If you do not have physical therapy scheduled, please call Dr. Noreen Pierre office to set up your first appointment as soon as possible.       Important Signs and Symptoms:  If any of the following signs and symptoms occurs, you should contact Dr. Noreen Pierre office. Please be advised if a problem arises which you feel requires immediate medical attention or you are unable to contact Dr. Kiya Love office, you should seek immediate medical attention. Signs and symptoms to watch for include:    1. A sudden increase in swelling and/or redness or warmth at the area of your surgery which isnt relieved by rest, ice, and elevation. 2. Oral temperature greater than 101.5 degrees for 12 hours or more which isnt relieved by an increase in fluid intake and taking two Tylenol every 4-6 hours. 3. Excessive drainage from your incisions, or drainage which hasnt stopped by 72 hours after your surgery. 4. Fever, chills, shortness of breath, chest pain, nausea, vomiting or other signs and symptoms which are of concern to you. DISCHARGE SUMMARY from Nurse    PATIENT INSTRUCTIONS:    After general anesthesia or intravenous sedation, for 24 hours or while taking prescription Narcotics:  · Limit your activities  · Do not drive and operate hazardous machinery  · Do not make important personal or business decisions  · Do  not drink alcoholic beverages  · If you have not urinated within 8 hours after discharge, please contact your surgeon on call. Report the following to your surgeon:  · Excessive pain, swelling, redness or odor of or around the surgical area  · Temperature over 100.5  · Nausea and vomiting lasting longer than 4 hours or if unable to take medications  · Any signs of decreased circulation or nerve impairment to extremity: change in color, persistent  numbness, tingling, coldness or increase pain  · Any questions    What to do at Home:  Recommended activity: Ambulate in house and No lifting, Driving, or Strenuous exercise until advised,     If you experience any of the following symptoms above, please follow up with Dr. Tha Mauricio. *  Please give a list of your current medications to your Primary Care Provider.     *  Please update this list whenever your medications are discontinued, doses are      changed, or new medications (including over-the-counter products) are added. *  Please carry medication information at all times in case of emergency situations. These are general instructions for a healthy lifestyle:    No smoking/ No tobacco products/ Avoid exposure to second hand smoke  Surgeon General's Warning:  Quitting smoking now greatly reduces serious risk to your health. Obesity, smoking, and sedentary lifestyle greatly increases your risk for illness    A healthy diet, regular physical exercise & weight monitoring are important for maintaining a healthy lifestyle    You may be retaining fluid if you have a history of heart failure or if you experience any of the following symptoms:  Weight gain of 3 pounds or more overnight or 5 pounds in a week, increased swelling in our hands or feet or shortness of breath while lying flat in bed. Please call your doctor as soon as you notice any of these symptoms; do not wait until your next office visit. Recognize signs and symptoms of STROKE:    F-face looks uneven    A-arms unable to move or move unevenly    S-speech slurred or non-existent    T-time-call 911 as soon as signs and symptoms begin-DO NOT go       Back to bed or wait to see if you get better-TIME IS BRAIN. Warning Signs of HEART ATTACK     Call 911 if you have these symptoms:   Chest discomfort. Most heart attacks involve discomfort in the center of the chest that lasts more than a few minutes, or that goes away and comes back. It can feel like uncomfortable pressure, squeezing, fullness, or pain.  Discomfort in other areas of the upper body. Symptoms can include pain or discomfort in one or both arms, the back, neck, jaw, or stomach.  Shortness of breath with or without chest discomfort.  Other signs may include breaking out in a cold sweat, nausea, or lightheadedness. Don't wait more than five minutes to call 911 - MINUTES MATTER! Fast action can save your life. Calling 911 is almost always the fastest way to get lifesaving treatment. Emergency Medical Services staff can begin treatment when they arrive -- up to an hour sooner than if someone gets to the hospital by car. Patient armband removed and shredded    The discharge information has been reviewed with the patient and caregiver. The patient and caregiver verbalized understanding. Discharge medications reviewed with the patient and caregiver and appropriate educational materials and side effects teaching were provided.   ___________________________________________________________________________________________________________________________________

## 2018-06-13 NOTE — IP AVS SNAPSHOT
[address] [address] 
[phone] Patient: Jerrod Valdez MRN: [id number] LISET:[birth year] About your hospitalization You were admitted on:  June 13, 2018 You last received care in the:  [facility] PHASE 2 RECOVERY You were discharged on:  June 13, 2018 Why you were hospitalized Your primary diagnosis was:  Not on File Follow-up Information Follow up With Details Comments Contact Info Kaushik Boyce, [address] [facility] [address] rehan Chaves [facility] 
[phone] Khadra Gongora MD  keep scheduled appointment [address] [place] [address] [address] [place] [address] 
[phone] Discharge Orders None A check tommy indicates which time of day the medication should be taken. My Medications START taking these medications Instructions Each Dose to Equal  
 Morning Noon Evening Bedtime  
 cyclobenzaprine 10 mg tablet Commonly known as:  FLEXERIL Your last dose was: Your next dose is: Take 0.5 Tabs by mouth three (3) times daily as needed for Muscle Spasm(s). 5 mg  
    
   
   
   
  
 naloxone 4 mg/actuation nasal spray Commonly known as:  [employer] Your last dose was: Your next dose is:    
   
   
 Use 1 spray intranasally into 1 nostril. Use a new Narcan nasal spray for subsequent doses and administer into alternating nostrils. May repeat every 2 to 3 minutes as needed. oxyCODONE-acetaminophen 5-325 mg per tablet Commonly known as:  PERCOCET Your last dose was: Your next dose is: Take 1-2 Tabs by mouth every four (4) hours as needed for Pain. Max Daily Amount: 12 Tabs. 1-2 Tab CONTINUE taking these medications Instructions Each Dose to Equal  
 Morning Noon Evening Bedtime  
 busPIRone 10 mg tablet Commonly known as:  BUSPAR  
   
 Your last dose was: Your next dose is: Take 10 mg by mouth two (2) times a day. 10 mg  
    
   
   
   
  
 gabapentin 300 mg capsule Commonly known as:  NEURONTIN Your last dose was: Your next dose is: Take 900 mg by mouth four (4) times daily. 900 mg PREVACID 30 mg capsule Generic drug:  lansoprazole Your last dose was: Your next dose is: Take 30 mg by mouth two (2) times a day. 30 mg  
    
   
   
   
  
 simvastatin 20 mg tablet Commonly known as:  ZOCOR Your last dose was: Your next dose is: Take 20 mg by mouth nightly. 20 mg  
    
   
   
   
  
 traZODone 150 mg tablet Commonly known as:  Oscar Macadamia Your last dose was: Your next dose is: Take 450 mg by mouth nightly. 450 mg  
    
   
   
   
  
 VITAMIN D3 1,000 unit tablet Generic drug:  cholecalciferol Your last dose was: Your next dose is: Take 2,000 Units by mouth daily. 2000 Units ZOLOFT 100 mg tablet Generic drug:  sertraline Your last dose was: Your next dose is: Take 200 mg by mouth daily. 200 mg ASK your doctor about these medications Instructions Each Dose to Equal  
 Morning Noon Evening Bedtime STOOL SOFTENER PO Your last dose was: Your next dose is: Take  by mouth daily. Where to Get Your Medications Information on where to get these meds will be given to you by the nurse or doctor. ! Ask your nurse or doctor about these medications  
  cyclobenzaprine 10 mg tablet  
 naloxone 4 mg/actuation nasal spray  
 oxyCODONE-acetaminophen 5-325 mg per tablet Opioid Education Prescription Opioids: What You Need to Know: Prescription opioids can be used to help relieve moderate-to-severe pain and are often prescribed following a surgery or injury, or for certain health conditions. These medications can be an important part of treatment but also come with serious risks. Opioids are strong pain medicines. Examples include hydrocodone, oxycodone, fentanyl, and morphine. Heroin is an example of an illegal opioid. It is important to work with your health care provider to make sure you are getting the safest, most effective care. WHAT ARE THE RISKS AND SIDE EFFECTS OF OPIOID USE? Prescription opioids carry serious risks of addiction and overdose, especially with prolonged use. An opioid overdose, often marked by slow breathing, can cause sudden death. The use of prescription opioids can have a number of side effects as well, even when taken as directed. · Tolerance-meaning you might need to take more of a medication for the same pain relief · Physical dependence-meaning you have symptoms of withdrawal when the medication is stopped. Withdrawal symptoms can include nausea, sweating, chills, diarrhea, stomach cramps, and muscle aches. Withdrawal can last up to several weeks, depending on which drug you took and how long you took it. · Increased sensitivity to pain · Constipation · Nausea, vomiting, and dry mouth · Sleepiness and dizziness · Confusion · Depression · Low levels of testosterone that can result in lower sex drive, energy, and strength · Itching and sweating RISKS ARE GREATER WITH:      
· History of drug misuse, substance use disorder, or overdose · Mental health conditions (such as depression or anxiety) · Sleep apnea · Older age (72 years or older) · Pregnancy Avoid alcohol while taking prescription opioids. Also, unless specifically advised by your health care provider, medications to avoid include: · Benzodiazepines (such as Xanax or Valium) · Muscle relaxants (such as Soma or Flexeril) · Hypnotics (such as Ambien or Lunesta) · Other prescription opioids KNOW YOUR OPTIONS Talk to your health care provider about ways to manage your pain that don't involve prescription opioids. Some of these options may actually work better and have fewer risks and side effects. Options may include: 
· Pain relievers such as acetaminophen, ibuprofen, and naproxen · Some medications that are also used for depression or seizures · Physical therapy and exercise · Counseling to help patients learn how to cope better with triggers of pain and stress. · Application of heat or cold compress · Massage therapy · Relaxation techniques Be Informed Make sure you know the name of your medication, how much and how often to take it, and its potential risks & side effects. IF YOU ARE PRESCRIBED OPIOIDS FOR PAIN: 
· Never take opioids in greater amounts or more often than prescribed. Remember the goal is not to be pain-free but to manage your pain at a tolerable level. · Follow up with your primary care provider to: · Work together to create a plan on how to manage your pain. · Talk about ways to help manage your pain that don't involve prescription opioids. · Talk about any and all concerns and side effects. · Help prevent misuse and abuse. · Never sell or share prescription opioids · Help prevent misuse and abuse. · Store prescription opioids in a secure place and out of reach of others (this may include visitors, children, friends, and family). · Safely dispose of unused/unwanted prescription opioids: Find your community drug take-back program or your pharmacy mail-back program, or flush them down the toilet, following guidance from the Food and Drug Administration (www.fda.gov/Drugs/ResourcesForYou). · Visit www.cdc.gov/drugoverdose to learn about the risks of opioid abuse and overdose.  
· If you believe you may be struggling with addiction, tell your health care provider and ask for guidance or call [address] [place], [place] [address]rose [facility] at [phone]. Discharge Instructions [address] [facility] Patient Discharge Instructions Arthroscopic Rotator Cuff Repair Discharge Instructions Dr. Luis Migeul Saunders Please take the time to review the following instructions before you leave the hospital and use them as guidelines during your recovery from surgery. If you have any questions, you may contact my office at [phone]. Would Care / Charly Shay You may change your dressings as needed. Beginning 2 days after you are discharged from the hospital, you should change your dressings daily. A big, bulky dressing isnt necessary as long as there is no drainage from the incisions. You can put a band-aid or piece of gauze over each incision. It isnt necessary to apply antibiotic ointment to your incisions. Caroline Mariano / Mariola Andreter You may shower 2 days after your surgery. Your dressing may be removed for showering. You may get your incisions wet in the shower. Do not vigorously scrub your incisions. Apply a clean, dry dressing after you have dried your incisions. Do not take a bath or get into a swimming pool or Jacuzzi until your follow up. Do not soak your incisions under water. Sling Keep your arm in the immobilizer/sling at all times except when showering and changing your clothes, and doing the exercises shown to you by your physical/occupational therapist prior to your discharge from the hospital. Keep your arm at your side when changing your clothes and showering. Do not move it away from your body. Ice and Elevation Continue ice consistently for 48 hours after surgery. After 48 hours, you should ice 3 times per day for 20 minutes at a time for the next 5 days. After 1 week from surgery, you may use ice as needed for pain. Diet You may advance your prehospital diet as tolerated. Increase your clear liquid intake for the next 2-3 days. Medications 1. You will be given prescriptions for pain medication, inflammation, and nausea when you are discharged from the hospital. Please use the medications as prescribed. Pain medications may cause constipation  Colace or Milk of Magnesia may be used as needed. Other possible side effects of pain medications are dizziness, headache, nausea, vomiting, and urinary retention. Discontinue the pain medication if you develop itching, rash, shortness of breath, or difficulties swallowing. If these symptoms become severe or arent relieved by discontinuing the medication, you should seek immediate medical attention. 2. Refills of pain medication are authorized during office hours only (8AM  5PM Monday through Friday) 3. If you were prescribed Percocet/Oxycodone or Dilaudid/Hydromorphone you must have a written prescription. These medications cannot be leagally called into the pharmacy. 4. Do not take Tylenol/Acetaminophen in addition to your pain medication, as most pain medications already contain this. Do not exceed 4000mg of Tylenol/Acetaminophen per day. 5. You may resume the medication you were taking prior to your surgery. Pain medication may change the effects of any antidepressant medication you may be taking. If you have any questions about possible interactions between your regular medication and the pain medication, you should consult the physician who prescribes your regular medications. Follow up appointment Please follow up at your scheduled appointment 7-10 days from the day of your surgery. If you do not already have an appointment, please call our office at [phone] for your follow up appointment. Physical Therapy: If you already have a therapy appointment, please be sure to attend your sessions as scheduled.  If you do not have physical therapy scheduled, please call Dr. Kiya Love office to set up your first appointment as soon as possible. Important Signs and Symptoms: 
If any of the following signs and symptoms occurs, you should contact Dr. Kiya Love office. Please be advised if a problem arises which you feel requires immediate medical attention or you are unable to contact Dr. Kiya tariq, you should seek immediate medical attention. Signs and symptoms to watch for include: 1. A sudden increase in swelling and/or redness or warmth at the area of your surgery which isnt relieved by rest, ice, and elevation. 2. Oral temperature greater than 101.5 degrees for 12 hours or more which isnt relieved by an increase in fluid intake and taking two Tylenol every 4-6 hours. 3. Excessive drainage from your incisions, or drainage which hasnt stopped by 72 hours after your surgery. 4. Fever, chills, shortness of breath, chest pain, nausea, vomiting or other signs and symptoms which are of concern to you. DISCHARGE SUMMARY from Nurse PATIENT INSTRUCTIONS: 
 
After general anesthesia or intravenous sedation, for 24 hours or while taking prescription Narcotics: · Limit your activities · Do not drive and operate hazardous machinery · Do not make important personal or business decisions · Do  not drink alcoholic beverages · If you have not urinated within 8 hours after discharge, please contact your surgeon on call. Report the following to your surgeon: 
· Excessive pain, swelling, redness or odor of or around the surgical area · Temperature over 100.5 · Nausea and vomiting lasting longer than 4 hours or if unable to take medications · Any signs of decreased circulation or nerve impairment to extremity: change in color, persistent  numbness, tingling, coldness or increase pain · Any questions What to do at Home: 
Recommended activity: Ambulate in house and No lifting, Driving, or Strenuous exercise until advised,  
 
 If you experience any of the following symptoms above, please follow up with Dr. Tha Mauricio. *  Please give a list of your current medications to your Primary Care Provider. *  Please update this list whenever your medications are discontinued, doses are 
    changed, or new medications (including over-the-counter products) are added. *  Please carry medication information at all times in case of emergency situations. These are general instructions for a healthy lifestyle: No smoking/ No tobacco products/ Avoid exposure to second hand smoke Surgeon General's Warning:  Quitting smoking now greatly reduces serious risk to your health. Obesity, smoking, and sedentary lifestyle greatly increases your risk for illness A healthy diet, regular physical exercise & weight monitoring are important for maintaining a healthy lifestyle You may be retaining fluid if you have a history of heart failure or if you experience any of the following symptoms:  Weight gain of 3 pounds or more overnight or 5 pounds in a week, increased swelling in our hands or feet or shortness of breath while lying flat in bed. Please call your doctor as soon as you notice any of these symptoms; do not wait until your next office visit. Recognize signs and symptoms of STROKE: 
 
F-face looks uneven A-arms unable to move or move unevenly S-speech slurred or non-existent T-time-call 911 as soon as signs and symptoms begin-DO NOT go Back to bed or wait to see if you get better-TIME IS BRAIN. Warning Signs of HEART ATTACK Call 911 if you have these symptoms: 
? Chest discomfort. Most heart attacks involve discomfort in the center of the chest that lasts more than a few minutes, or that goes away and comes back. It can feel like uncomfortable pressure, squeezing, fullness, or pain. ? Discomfort in other areas of the upper body.  Symptoms can include pain or discomfort in one or both arms, the back, neck, jaw, or stomach. ? Shortness of breath with or without chest discomfort. ? Other signs may include breaking out in a cold sweat, nausea, or lightheadedness. Don't wait more than five minutes to call [address]! Fast action can save your life. Calling 911 is almost always the fastest way to get lifesaving treatment. Emergency Medical Services staff can begin treatment when they arrive  up to an hour sooner than if someone gets to the hospital by car. Patient armband removed and shredded The discharge information has been reviewed with the patient and caregiver. The patient and caregiver verbalized understanding. Discharge medications reviewed with the patient and caregiver and appropriate educational materials and side effects teaching were provided. ___________________________________________________________________________________________________________________________________ Introducing [facility]! Nichole Bernal introduces [employer] patient portal. Now you can access parts of your medical record, email your doctor's office, and request medication refills online. 1. In your internet browser, go to [email or web address] 2. Click on the First Time User? Click Here link in the Sign In box. You will see the New Member Sign Up page. 3. Enter your [employer] Access Code exactly as it appears below. You will not need to use this code after youve completed the sign-up process. If you do not sign up before the expiration date, you must request a new code. · [employer] Access Code: [email or web address] Expires: 9/3/2018  1:15 PM 
 
4. Enter the last four digits of your Social Security Number (xxxx) and Date of Birth (mm/dd/yyyy) as indicated and click Submit. You will be taken to the next sign-up page. 5. Create a MyChart ID.  This will be your [employer] login ID and cannot be changed, so think of one that is secure and easy to remember. 6. Create a Opzi password. You can change your password at any time. 7. Enter your Password Reset Question and Answer. This can be used at a later time if you forget your password. 8. Enter your e-mail address. You will receive e-mail notification when new information is available in [address] 9. Click Sign Up. You can now view and download portions of your medical record. 10. Click the Download Summary menu link to download a portable copy of your medical information. If you have questions, please visit the Frequently Asked Questions section of the [employer] website. Remember, [employer]zi is NOT to be used for urgent needs. For medical emergencies, dial 911. Now available from your iPhone and Android! Introducing Aman Luo As a [employer] patient, I wanted to make you aware of our electronic visit tool called Aman Luo. [employer] 24/7 allows you to connect within minutes with a medical provider 24 hours a day, seven days a week via a mobile device or tablet or logging into a secure website from your computer. You can access Aman Luo from anywhere in the [place]. A virtual visit might be right for you when you have a simple condition and feel like you just dont want to get out of bed, or cant get away from work for an appointment, when your regular [employer] provider is not available (evenings, weekends or holidays), or when youre out of town and need minor care. Electronic visits cost only $49 and if the [employer] 24/7 provider determines a prescription is needed to treat your condition, one can be electronically transmitted to a nearby pharmacy*. Please take a moment to enroll today if you have not already done so. The enrollment process is free and takes just a few minutes.   To enroll, please download the [employer] 24/7 moise to your tablet or phone, or visit [email or web address] to enroll on your computer. And, as an [address] [place] [address] patient with a [employer] account, the results of your visits will be scanned into your electronic medical record and your primary care provider will be able to view the scanned results. We urge you to continue to see your regular [employer] provider for your ongoing medical care. And while your primary care provider may not be the one available when you seek a [employer] virtual visit, the peace of mind you get from getting a real diagnosis real time can be priceless. For more information on [employer], view our Frequently Asked Questions (FAQs) at [email or web address]. Sincerely, 
 
Didi Bundy MD 
Chief Medical Officer [facility] Ysabel Uribe *:  certain medications cannot be prescribed via [employer] Providers Seen During Your Hospitalization Provider Specialty Primary office phone Francisco Pandya MD Orthopedic Surgery [phone] Your Primary Care Physician (PCP) Primary Care Physician Office Phone Office Fax Kamini Meneses [phone] You are allergic to the following Allergen Reactions Tetanus And Diphther. Tox (Pf) Swelling Swelling of arm, and fever Pcn (Penicillins) Rash Pt can take Ancef, rash was from PCN when she was child Recent Documentation Height Weight BMI OB Status Smoking Status 1.6 m 67.3 kg 26.28 kg/m2 Postmenopausal Former Smoker Emergency Contacts Name Discharge Info Relation Home Work Mobile Nicole Gutierrez CAREGIVER [3] Friend [5]   [phone] Mark Huddleston DISCHARGE CAREGIVER [3] Spouse [3] [phone] Patient Belongings  The following personal items are in your possession at time of discharge: 
  Dental Appliances: None  Visual Aid: None      Home Medications: None Jewelry: None  Clothing: Undergarments, Footwear, Dress (Placed in locker )    Other Valuables: Audelia Colindres, Other (comment) (Lawrence Balling , old sling in bag in locker ) Please provide this summary of care documentation to your next provider. Signatures-by signing, you are acknowledging that this After Visit Summary has been reviewed with you and you have received a copy. Patient Signature:  ____________________________________________________________ Date:  ____________________________________________________________  
  
Juju Pane Provider Signature:  ____________________________________________________________ Date:  ____________________________________________________________

## 2018-06-13 NOTE — ANESTHESIA PROCEDURE NOTES
Peripheral Block    Start time: 6/13/2018 12:02 PM  End time: 6/13/2018 12:09 PM  Performed by: Dmitri Madrigal by: John Dobbins: Indications: at surgeon's request and procedure for pain    Preanesthetic Checklist: patient identified, risks and benefits discussed, site marked, timeout performed, anesthesia consent given and patient being monitored      Block Type:   Block Type:   Interscalene  Laterality:  Left  Monitoring:  Standard ASA monitoring, continuous pulse ox, frequent vital sign checks, heart rate, responsive to questions and oxygen  Injection Technique:  Single shot  Procedures: ultrasound guided and nerve stimulator    Patient Position: seated  Prep: chlorhexidine    Location:  Interscalene  Needle Type:  Stimuplex  Needle Gauge:  22 G  Needle Localization:  Anatomical landmarks, ultrasound guidance and nerve stimulator  Motor Response: minimal motor response >0.4 mA    Medication Injected:  0.5%  ropivacaine  Volume (mL):  25    Assessment:  Number of attempts:  1  Injection Assessment:  Incremental injection every 5 mL, local visualized surrounding nerve on ultrasound, negative aspiration for CSF, negative aspiration for blood, no paresthesia, no intravascular symptoms and ultrasound image on chart  Patient tolerance:  Patient tolerated the procedure well with no immediate complications

## 2018-06-13 NOTE — ANESTHESIA PREPROCEDURE EVALUATION
Anesthetic History   No history of anesthetic complications            Review of Systems / Medical History  Patient summary reviewed, nursing notes reviewed and pertinent labs reviewed    Pulmonary  Within defined limits                 Neuro/Psych   Within defined limits           Cardiovascular            Dysrhythmias : PVC    Pertinent negatives: No hypertension  Exercise tolerance: >4 METS     GI/Hepatic/Renal     GERD           Endo/Other        Arthritis  Pertinent negatives: No diabetes, hypothyroidism and hyperthyroidism   Other Findings   Comments: hoarse         Physical Exam    Airway  Mallampati: II  TM Distance: < 4 cm  Neck ROM: normal range of motion   Mouth opening: Diminished (comment)     Cardiovascular    Rhythm: regular  Rate: normal         Dental    Dentition: Caps/crowns and Bridges     Pulmonary  Breath sounds clear to auscultation               Abdominal  GI exam deferred       Other Findings            Anesthetic Plan    ASA: 2  Anesthesia type: general and regional - interscalene block      Post-op pain plan if not by surgeon: peripheral nerve block single    Induction: Intravenous  Anesthetic plan and risks discussed with: Patient      Risk of a block include nerve injury, bleeding, infection, and failure as the most common ones although they rare.

## 2018-06-13 NOTE — ANESTHESIA POSTPROCEDURE EVALUATION
Post-Anesthesia Evaluation and Assessment    Cardiovascular Function/Vital Signs  Visit Vitals    BP 99/62    Pulse 89    Temp 36.1 °C (97 °F)    Resp 13    Ht 5' 3\" (1.6 m)    Wt 67.3 kg (148 lb 6 oz)    SpO2 97%    BMI 26.28 kg/m2       Patient is status post Procedure(s):  LEFT SHOULDER ARTHROSCOPY WITH SUBACROMIAL DECOMPRESSION, LABRAL DEBRIDEMENT. Nausea/Vomiting: Controlled. Postoperative hydration reviewed and adequate. Pain:  Pain Scale 1: Numeric (0 - 10) (06/13/18 1408)  Pain Intensity 1: 0 (06/13/18 1408)   Managed. Neurological Status:   Neuro (WDL): Within Defined Limits (06/13/18 1108)   At baseline. Mental Status and Level of Consciousness: Arousable. Pulmonary Status:   O2 Device: Room air (06/13/18 1408)   Adequate oxygenation and airway patent. Complications related to anesthesia: None    Post-anesthesia assessment completed. No concerns. Patient has met all discharge requirements.     Signed By: Naomi Gracia CRNA    June 13, 2018

## 2018-06-13 NOTE — PERIOP NOTES
Received patient from [place] [id number] and anesthesia provider. Reviewed surgical procedure, intraoperative course. Patient identified.

## 2018-06-13 NOTE — PERIOP NOTES
Discharge instructions completed - opportunity for questions - denies c/o pain - tolerating po fluids

## 2018-06-13 NOTE — BRIEF OP NOTE
BRIEF OPERATIVE NOTE    Date of Procedure: 6/13/2018   Preoperative Diagnosis: UNSPECIFIED ROTATOR CUFF TEAR OR RUPTURE OF LEFT SHOULDER,  NOT SPECIFIED AS TRAUMATIC, IMPINGEMENT SYNDROM OF LEFT SHOULDER  Postoperative Diagnosis: UNSPECIFIED ROTATOR CUFF TEAR OR RUPTURE OF LEFT SHOULDER,  NOT SPECIFIED AS TRAUMATIC, IMPINGEMENT SYNDROM OF LEFT SHOULDER    Procedure(s):  LEFT SHOULDER ARTHROSCOPY WITH SUBACROMIAL DECOMPRESSION, LABRAL DEBRIDEMENT  Surgeon(s) and Role:     * Brittany Quinones MD - Primary         Surgical Staff:  Rip Chapman: Kye Calvin  Physician Assistant: Ken Martinez PA-C  Scrub Tech-1: Doug Chris  Scrub RN-1: Michelle Blanca RN  Event Time In   Incision Start 1251   Incision Close 1323     Anesthesia: General   Estimated Blood Loss: minimal  Specimens: * No specimens in log *   Findings: impingement    Complications: none  Implants: * No implants in log *

## 2018-06-13 NOTE — INTERVAL H&P NOTE
H&P Update: Makeda Valencia was seen and examined. History and physical has been reviewed. The patient has been examined. There have been no significant clinical changes since the completion of the originally dated History and Physical.  Patient identified by surgeon; surgical site was confirmed by patient and surgeon.     Signed By: Maynor Elliott MD     June 13, 2018 10:56 AM

## 2018-06-14 NOTE — OP NOTES
[facility] [place]  OPERATIVE REPORT    Estefanía Alonso  MR#: [id number]  : 1950  ACCOUNT #: [de-identified]   DATE OF SERVICE: 2018    SURGEON:  Lion Ponce MD    PREOPERATIVE DIAGNOSES:  Left shoulder impingement and possible rotator cuff tear. POSTOPERATIVE DIAGNOSES:  Left shoulder impingement and labral fraying. PROCEDURE PERFORMED:  Left shoulder arthroscopy with labral debridement and subacromial decompression. BRIEF HISTORY:  The patient is a 81-year-old female who has been suffering from significant left shoulder pain for months. She has failed conservative therapy including activity modification, anti-inflammatories, injections, and other modalities. We did discuss the option of surgical management. She understood the risks and benefits and elected to proceed. OPERATIVE COURSE:  The patient was brought to the operating suite, properly identified, placed supine upon the operating table and placed under a general anesthetic. She did have a block placed in preoperative holding. Once an adequate level of anesthesia was obtained, she was placed in a beach chair position. She was properly padded and positioned. She was then prepped and draped in usual sterile fashion. The joint was injected with saline. A posterior portal was made in standard fashion. The arthroscope was advanced into the joint. A complete survey was performed. The glenohumeral surfaces were in good condition with no significant arthritis to some grade I softening. The labrum did have what appeared to be a sublabral foramen. The biceps tendon itself was visualized and intact and had a stable attachment to the labrum and a stable egress the joint. The undersurface of the cuff showed no significant tearing. Subscapularis tendon was visualized and intact. No loose bodies were noted in the inferior sulcus.   An anterior superior portal was made in standard fashion after using a spinal needle for localization. The labrum was probed. It was stable. The frayed portion of labrum in the anterior superior portion was debrided back to a stable base. Again, the remaining tissue was stable though and did not affect the integrity of the biceps anchor. The arthroscope was then advanced into the subacromial space. Significant hyperemia of the bursa was noted. A lateral portal was made in standard fashion after using a spinal needle for localization. The vapor electrocautery was used to resect the bursal tissue. The cuff was evaluated and intact. There was spurring off the underside of the acromion. That was resected back and co-planed smooth so that there was no further impingement. Going towards where the native [facility] joint would be, there was significant scarring. A lot of that was debrided. There was no impingement of any bony kind at the [facility] joint. The area was then lavaged. Instruments were removed. The portal sites were closed with 3-0 nylon. A sterile compressive dressing was applied. The patient was awakened and transferred to the recovery room in stable condition. All needle and sponge counts were reported correct at the end of the case. ASSISTANT:  DEVI Okeefe did assist throughout the entire procedure. IMPLANTS:  None. ESTIMATED BLOOD LOSS:  Minimal.      ANESTHESIA:  General and regional.      COMPLICATIONS:  None.       SPECIMENS REMOVED:  none      MD SHARON Marcelino /   D: 06/14/2018 06:58     T: 06/14/2018 07:40  JOB #: [id number]

## 2021-11-02 ENCOUNTER — HOSPITAL ENCOUNTER (EMERGENCY)
Age: 71
Discharge: HOME | End: 2021-11-02
Attending: EMERGENCY MEDICINE | Admitting: EMERGENCY MEDICINE
Payer: MEDICARE

## 2021-11-02 VITALS
OXYGEN SATURATION: 100 % | DIASTOLIC BLOOD PRESSURE: 63 MMHG | SYSTOLIC BLOOD PRESSURE: 135 MMHG | WEIGHT: 152.56 LBS | RESPIRATION RATE: 16 BRPM | BODY MASS INDEX: 27.03 KG/M2 | HEIGHT: 63 IN | HEART RATE: 69 BPM | TEMPERATURE: 97.7 F

## 2021-11-02 DIAGNOSIS — Z00.00 EVALUATION BY MEDICAL SERVICE REQUIRED: Primary | ICD-10-CM

## 2021-11-02 DIAGNOSIS — F10.10 ALCOHOL ABUSE: ICD-10-CM

## 2021-11-02 LAB
ALBUMIN SERPL-MCNC: 3.6 G/DL (ref 3.5–5)
ALBUMIN/GLOB SERPL: 1.1 {RATIO} (ref 1.1–2.2)
ALP SERPL-CCNC: 52 U/L (ref 45–117)
ALT SERPL-CCNC: 22 U/L (ref 12–78)
ANION GAP SERPL CALC-SCNC: 4 MMOL/L (ref 5–15)
AST SERPL-CCNC: 19 U/L (ref 15–37)
BASOPHILS # BLD: 0.1 K/UL (ref 0–0.1)
BASOPHILS NFR BLD: 1 % (ref 0–1)
BILIRUB SERPL-MCNC: 0.3 MG/DL (ref 0.2–1)
BUN SERPL-MCNC: 28 MG/DL (ref 6–20)
BUN/CREAT SERPL: 31 (ref 12–20)
CALCIUM SERPL-MCNC: 9 MG/DL (ref 8.5–10.1)
CHLORIDE SERPL-SCNC: 106 MMOL/L (ref 97–108)
CO2 SERPL-SCNC: 29 MMOL/L (ref 21–32)
CREAT SERPL-MCNC: 0.89 MG/DL (ref 0.55–1.02)
DIFFERENTIAL METHOD BLD: ABNORMAL
EOSINOPHIL # BLD: 0.1 K/UL (ref 0–0.4)
EOSINOPHIL NFR BLD: 2 % (ref 0–7)
ERYTHROCYTE [DISTWIDTH] IN BLOOD BY AUTOMATED COUNT: 14.6 % (ref 11.5–14.5)
ETHANOL SERPL-MCNC: <10 MG/DL
GLOBULIN SER CALC-MCNC: 3.3 G/DL (ref 2–4)
GLUCOSE SERPL-MCNC: 96 MG/DL (ref 65–100)
HCT VFR BLD AUTO: 33.4 % (ref 35–47)
HGB BLD-MCNC: 10.3 G/DL (ref 11.5–16)
IMM GRANULOCYTES # BLD AUTO: 0 K/UL (ref 0–0.04)
IMM GRANULOCYTES NFR BLD AUTO: 0 % (ref 0–0.5)
LYMPHOCYTES # BLD: 2.3 K/UL (ref 0.8–3.5)
LYMPHOCYTES NFR BLD: 36 % (ref 12–49)
MCH RBC QN AUTO: 26.5 PG (ref 26–34)
MCHC RBC AUTO-ENTMCNC: 30.8 G/DL (ref 30–36.5)
MCV RBC AUTO: 86.1 FL (ref 80–99)
MONOCYTES # BLD: 0.5 K/UL (ref 0–1)
MONOCYTES NFR BLD: 8 % (ref 5–13)
NEUTS SEG # BLD: 3.4 K/UL (ref 1.8–8)
NEUTS SEG NFR BLD: 53 % (ref 32–75)
NRBC # BLD: 0 K/UL (ref 0–0.01)
NRBC BLD-RTO: 0 PER 100 WBC
PLATELET # BLD AUTO: 261 K/UL (ref 150–400)
PMV BLD AUTO: 9.9 FL (ref 8.9–12.9)
POTASSIUM SERPL-SCNC: 4.4 MMOL/L (ref 3.5–5.1)
PROT SERPL-MCNC: 6.9 G/DL (ref 6.4–8.2)
RBC # BLD AUTO: 3.88 M/UL (ref 3.8–5.2)
SODIUM SERPL-SCNC: 139 MMOL/L (ref 136–145)
WBC # BLD AUTO: 6.4 K/UL (ref 3.6–11)

## 2021-11-02 PROCEDURE — 36415 COLL VENOUS BLD VENIPUNCTURE: CPT

## 2021-11-02 PROCEDURE — 85025 COMPLETE CBC W/AUTO DIFF WBC: CPT

## 2021-11-02 PROCEDURE — 80053 COMPREHEN METABOLIC PANEL: CPT

## 2021-11-02 PROCEDURE — 82077 ASSAY SPEC XCP UR&BREATH IA: CPT

## 2021-11-02 PROCEDURE — 99282 EMERGENCY DEPT VISIT SF MDM: CPT

## 2021-11-02 NOTE — DISCHARGE INSTRUCTIONS
You were evaluated in the emergency department for alcohol use and need for medical clearance for inpatient detox and rehabilitation. Your blood work is unremarkable and vital signs are stable. You are not showing any sign of alcohol withdrawal or delirium tremens. You are medically cleared for inpatient detox and rehabilitation. It was a pleasure taking care of you at [facility] Emergency Department today. We know that when you come to [facility], you are entrusting us with your health, comfort, and safety. Our physicians and nurses honor that trust, and we truly appreciate the opportunity to care for you and your loved ones. We also value your feedback. If you receive a survey about your Emergency Department experience today, please fill it out. We care about our patients' feedback, and we listen to what you have to say. Thank you!

## 2021-11-02 NOTE — ED PROVIDER NOTES
EMERGENCY DEPARTMENT HISTORY AND PHYSICAL EXAM      Date: 11/2/2021  Patient Name: Lorie Marx    History of Presenting Illness     Chief Complaint   Patient presents with    Alcohol Problem       History Provided By: Patient    HPI: Lorie Marx, 73 y.o. female with history of alcohol abuse presents to the ED with cc of need for medical evaluation for medical clearance to enter detox and rehabilitation. Patient recently left inpatient alcohol rehabilitation and found a bottle of alcohol in her home and drank some last night. She did not drink any alcohol this morning. She desires to go back into inpatient alcohol detox/rehabilitation to maintain sobriety but was told that she required MD evaluation and medical clearance before they would accept her back. She denies any prior history of alcohol withdrawal seizures or DTs. She denies any tremors, shakiness, jitteriness, or seizures this morning. She denies any medical complaints here in the ED. Last alcohol was last night. There are no other complaints, changes, or physical findings at this time. PCP: Calista Zavala MD    No current facility-administered medications on file prior to encounter. Current Outpatient Medications on File Prior to Encounter   Medication Sig Dispense Refill    oxyCODONE-acetaminophen (PERCOCET) 5-325 mg per tablet Take 1-2 Tabs by mouth every four (4) hours as needed for Pain. Max Daily Amount: 12 Tabs. 30 Tab 0    cyclobenzaprine (FLEXERIL) 10 mg tablet Take 0.5 Tabs by mouth three (3) times daily as needed for Muscle Spasm(s). 30 Tab 0    naloxone (NARCAN) 4 mg/actuation nasal spray Use 1 spray intranasally into 1 nostril. Use a new Narcan nasal spray for subsequent doses and administer into alternating nostrils. May repeat every 2 to 3 minutes as needed. 3 Each 0    docusate sodium (STOOL SOFTENER PO) Take  by mouth daily.  traZODone (DESYREL) 150 mg tablet Take 450 mg by mouth nightly.       simvastatin (ZOCOR) 20 mg tablet Take 20 mg by mouth nightly.  lansoprazole (PREVACID) 30 mg capsule Take 30 mg by mouth two (2) times a day.  gabapentin (NEURONTIN) 300 mg capsule Take 900 mg by mouth four (4) times daily.  busPIRone (BUSPAR) 10 mg tablet Take 10 mg by mouth two (2) times a day.  sertraline (ZOLOFT) 100 mg tablet Take 200 mg by mouth daily.  cholecalciferol (VITAMIN D3) 1,000 unit tablet Take 2,000 Units by mouth daily. Past History     Past Medical History:  Past Medical History:   Diagnosis Date    Aneurysm ([address] 1996    cerebral    Arthritis     GERD (gastroesophageal reflux disease)     Psychiatric disorder     chronic depression and OCD    PVC (premature ventricular contraction)        Past Surgical History:  Past Surgical History:   Procedure Laterality Date    HX CATARACT REMOVAL Bilateral     HX PELVIC LAPAROSCOPY      infertility x 3    HX TONSILLECTOMY  1968    NEUROLOGICAL PROCEDURE UNLISTED  1996    Titanium clip for cerebral aneurysm       Family History:  No family history on file. Social History:  Social History     Tobacco Use    Smoking status: Former Smoker    Smokeless tobacco: Never Used   Substance Use Topics    Alcohol use: No     Comment: H/O alcoholism, last drink 3 months ago    Drug use: No     Comment: H/O in the past marijauna and cocaine       Allergies: Allergies   Allergen Reactions    Tetanus And Diphther. Tox (Pf) Swelling     Swelling of arm, and fever    Pcn [Penicillins] Rash     Pt can take Ancef, rash was from PCN when she was child          Review of Systems   Review of Systems   Constitutional: Negative for chills and fever. HENT: Negative. Eyes: Negative for visual disturbance. Respiratory: Negative for cough and shortness of breath. Cardiovascular: Negative for chest pain and leg swelling. Gastrointestinal: Negative for abdominal pain, nausea and vomiting. Genitourinary: Negative. Musculoskeletal: Negative for back pain and gait problem. Skin: Negative for color change and rash. Neurological: Negative for dizziness, weakness, light-headedness and headaches. Hematological: Does not bruise/bleed easily. All other systems reviewed and are negative. Physical Exam   Physical Exam  Vitals and nursing note reviewed. Constitutional:       General: She is not in acute distress. Appearance: Normal appearance. She is not ill-appearing, toxic-appearing or diaphoretic. Comments: Appears clinically sober. HENT:      Head: Normocephalic and atraumatic. Cardiovascular:      Rate and Rhythm: Normal rate and regular rhythm. Heart sounds: Normal heart sounds. No murmur heard. Pulmonary:      Effort: Pulmonary effort is normal. No respiratory distress. Breath sounds: Normal breath sounds. No wheezing. Abdominal:      Palpations: Abdomen is soft. Tenderness: There is no abdominal tenderness. There is no guarding or rebound. Skin:     General: Skin is warm and dry. Findings: No erythema or rash. Neurological:      General: No focal deficit present. Mental Status: She is alert and oriented to person, place, and time. Comments: Ambulatory in the ED without difficulty. No fasciculations, no tremors. Psychiatric:         Mood and Affect: Mood normal.         Behavior: Behavior normal.         Thought Content:  Thought content normal.         Diagnostic Study Results     Labs -  Labs Reviewed   CBC WITH AUTOMATED DIFF - Abnormal; Notable for the following components:       Result Value    HGB 10.3 (*)     HCT 33.4 (*)     RDW 14.6 (*)     All other components within normal limits   METABOLIC PANEL, COMPREHENSIVE - Abnormal; Notable for the following components:    Anion gap 4 (*)     BUN 28 (*)     BUN/Creatinine ratio 31 (*)     All other components within normal limits   ETHYL ALCOHOL       Radiologic Studies -   No orders to display     CT Results  (Last 48 hours)    None        CXR Results  (Last 48 hours)    None          Medical Decision Making   I am the first provider for this patient. I reviewed the vital signs, available nursing notes, past medical history, past surgical history, family history and social history. Vital Signs-Reviewed the patient's vital signs. Visit Vitals  /63 (BP 1 Location: Left upper arm, BP Patient Position: At rest)   Pulse 69   Temp 97.7 °F (36.5 °C)   Resp 16   Ht 5' 3\" (1.6 m)   Wt 69.2 kg (152 lb 8.9 oz)   SpO2 100%   BMI 27.02 kg/m²       Records Reviewed: Nursing Notes    Provider Notes (Medical Decision Making):   57-year-old female with alcoholism presenting for medical clearance so that she can enter alcohol detox and rehabilitation after she drank some alcohol last night. Denies any alcohol this morning. She is afebrile and vital signs are stable. Appears clinically sober and there is no signs of alcohol withdrawal at this time. No hypertension, tachycardia, fasciculations, or tremors on my exam.  Alcohol negative, chemistry unremarkable, CBC unremarkable. She is medically cleared for discharge so that she can enter alcohol detox and rehabilitation. Given strict return precautions all questions answered. ED Course:   Initial assessment performed. The patients presenting problems have been discussed, and they are in agreement with the care plan formulated and outlined with them. I have encouraged them to ask questions as they arise throughout their visit. Discharge Note:  The patient has been re-evaluated and is ready for discharge. Reviewed available results with patient. Counseled patient on diagnosis and care plan. Patient has expressed understanding, and all questions have been answered. Patient agrees with plan and agrees to follow up as recommended, or to return to the ED if their symptoms worsen.  Discharge instructions have been provided and explained to the patient, along with reasons to return to the ED. Disposition:  Discharge    DISCHARGE PLAN:  1. Discharge Medication List as of 11/2/2021  1:51 PM        2. Follow-up Information     Follow up With Specialties Details Why Contact Info    [facility] EMERGENCY DEPT Emergency Medicine Go to  As needed, If symptoms worsen [address] [place] [address]  [phone]        3. Return to ED if worse     Diagnosis     Clinical Impression:   1. Evaluation by medical service required    2. Alcohol abuse        Attestations:  I am the first and primary provider of record for this patient's ED encounter. I personally performed the services described above in this documentation. Lizette Craig MD    Please note that this dictation was completed with [employer], the computer voice recognition software. Quite often unanticipated grammatical, syntax, homophones, and other interpretive errors are inadvertently transcribed by the computer software. Please disregard these errors. Please excuse any errors that have escaped final proofreading. Thank you.

## 2021-11-02 NOTE — ED TRIAGE NOTES
Patient presents to triage ambulatory requesting a medical clearance. Patient reports she needs a medical clearance to return to an inpatient alcohol program since she consumed alcohol last night.

## 2021-11-02 NOTE — ED NOTES
Pt reports being 5 days sober from alcohol and was d/c yesterday from rehab program. She states she found some alcohol while at home and drank it. Pt denied any issues or complaints. Reports she just needs medical clearance to return to substance abuse rehab.

## 2021-11-02 NOTE — ED NOTES
I have reviewed discharge instructions with the patient. The patient verbalized understanding. I.V. removed.

## 2023-05-12 RX ORDER — CYCLOBENZAPRINE HCL 10 MG
TABLET ORAL 3 TIMES DAILY PRN
COMMUNITY
Start: 2018-06-13

## 2023-05-12 RX ORDER — LANSOPRAZOLE 30 MG/1
CAPSULE, DELAYED RELEASE ORAL 2 TIMES DAILY
COMMUNITY

## 2023-05-12 RX ORDER — SERTRALINE HYDROCHLORIDE 100 MG/1
200 TABLET, FILM COATED ORAL DAILY
COMMUNITY

## 2023-05-12 RX ORDER — NALOXONE HYDROCHLORIDE 4 MG/.1ML
SPRAY NASAL
COMMUNITY
Start: 2018-06-13

## 2023-05-12 RX ORDER — GABAPENTIN 300 MG/1
CAPSULE ORAL 4 TIMES DAILY
COMMUNITY

## 2023-05-12 RX ORDER — BUSPIRONE HYDROCHLORIDE 10 MG/1
TABLET ORAL 2 TIMES DAILY
COMMUNITY

## 2023-05-12 RX ORDER — TRAZODONE HYDROCHLORIDE 150 MG/1
TABLET ORAL
COMMUNITY

## 2023-05-12 RX ORDER — OXYCODONE HYDROCHLORIDE AND ACETAMINOPHEN 5; 325 MG/1; MG/1
1-2 TABLET ORAL EVERY 4 HOURS PRN
COMMUNITY
Start: 2018-06-13

## 2023-05-12 RX ORDER — SIMVASTATIN 20 MG
20 TABLET ORAL NIGHTLY
COMMUNITY

## (undated) DEVICE — STERILE POLYISOPRENE POWDER-FREE SURGICAL GLOVES: Brand: PROTEXIS

## (undated) DEVICE — STERILE POLYISOPRENE POWDER-FREE SURGICAL GLOVES WITH EMOLLIENT COATING: Brand: PROTEXIS

## (undated) DEVICE — DRESSING,GAUZE,XEROFORM,CURAD,1"X8",ST: Brand: CURAD

## (undated) DEVICE — ELECTRODE RF DIA4MM 90DEG SUCT W/ INTEGR HNDPC VAPR S90

## (undated) DEVICE — TUBE IRRIG L8IN LNG PT W/ CONN FOR PMP SYS REDEUCE

## (undated) DEVICE — KNEE ARTHROSCOPY III-LF: Brand: MEDLINE INDUSTRIES, INC.

## (undated) DEVICE — GOWN,SIRUS,NONRNF,SETINSLV,2XL,18/CS: Brand: MEDLINE

## (undated) DEVICE — DEVON™ KNEE AND BODY STRAP 60" X 3" (1.5 M X 7.6 CM): Brand: DEVON

## (undated) DEVICE — SUTURE ETHLN SZ 4-0 L18IN NONABSORBABLE BLK L19MM PS-2 3/8 1667H

## (undated) DEVICE — 3M™ COBAN™ NL STERILE NON-LATEX SELF-ADHERENT WRAP, 2086S, 6 IN X 5 YD (15 CM X 4,5 M), 12 ROLLS/CASE: Brand: 3M™ COBAN™

## (undated) DEVICE — DYONICS 5.5 FULL RADIUS BONECUTTER                                    BLADES, ORANGE, 8000 MAXIMUM RPM,                                    PACKAGED 6 PER BOX, STERILE

## (undated) DEVICE — DRAPE,SHOULDER,BEACH CHAIR,STERILE: Brand: MEDLINE

## (undated) DEVICE — KENDALL SCD EXPRESS SLEEVES, KNEE LENGTH, MEDIUM: Brand: KENDALL SCD

## (undated) DEVICE — (D)PREP SKN CHLRAPRP APPL 26ML -- CONVERT TO ITEM 371833

## (undated) DEVICE — SYR 50ML LR LCK 1ML GRAD NSAF --

## (undated) DEVICE — MEDI-VAC NON-CONDUCTIVE SUCTION TUBING: Brand: CARDINAL HEALTH

## (undated) DEVICE — 4.5 MM INCISOR PLUS STRAIGHT                                    BLADES, POWER/EP-1, VIOLET, PACKAGED                                    6 PER BOX, STERILE

## (undated) DEVICE — SOLUTION IRRIG 3000ML LAC R FLX CONT